# Patient Record
Sex: MALE | Race: WHITE | URBAN - METROPOLITAN AREA
[De-identification: names, ages, dates, MRNs, and addresses within clinical notes are randomized per-mention and may not be internally consistent; named-entity substitution may affect disease eponyms.]

---
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
## 2020-05-29 ENCOUNTER — TELEPHONE (OUTPATIENT)
Dept: FAMILY MEDICINE CLINIC | Facility: CLINIC | Age: 20
End: 2020-05-29

## 2020-05-29 NOTE — TELEPHONE ENCOUNTER
Scheduled appt sooner. Bug bite on leg.      Future Appointments   Date Time Provider Trever Beasley   6/1/2020  4:15 PM ILIR Self [facility] [place] [facility]

## 2020-05-29 NOTE — TELEPHONE ENCOUNTER
Future Appointments   Date Time Provider Trever Beasley   6/22/2020  4:15 PM ILIR Lafleur [facility] [place] [facility] [place]       Made an appointment online for a bug bite. Please triage.

## 2020-06-01 ENCOUNTER — OFFICE VISIT (OUTPATIENT)
Dept: FAMILY MEDICINE CLINIC | Facility: CLINIC | Age: 20
End: 2020-06-01
Payer: COMMERCIAL

## 2020-06-01 VITALS
RESPIRATION RATE: 16 BRPM | BODY MASS INDEX: 23.91 KG/M2 | TEMPERATURE: 99 F | HEIGHT: 70 IN | OXYGEN SATURATION: 97 % | SYSTOLIC BLOOD PRESSURE: 120 MMHG | DIASTOLIC BLOOD PRESSURE: 70 MMHG | WEIGHT: 167 LBS | HEART RATE: 82 BPM
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
## 2020-06-01 DIAGNOSIS — S40.862A INSECT BITE OF LEFT UPPER ARM, INITIAL ENCOUNTER: Primary | ICD-10-CM

## 2020-06-01 PROCEDURE — 99213 OFFICE O/P EST LOW 20 MIN: CPT | Performed by: NURSE PRACTITIONER

## 2020-06-01 NOTE — PROGRESS NOTES
Stewart Bronson is a 23year old male. Patient presents with: Other: possible bug bite on left bicep. Occurred 2 weeks ago       HPI:   Complaints of an insect bite 2 weeks ago- was painful - got more red - pulsating. Itched -  -  warm    No fever. were placed in this encounter. Meds & Refills for this Visit:  Requested Prescriptions      No prescriptions requested or ordered in this encounter       Imaging & Consults:  None    No follow-ups on file.   Patient Instructions   Monitor wound-  If in

## 2020-08-24 ENCOUNTER — TELEPHONE (OUTPATIENT)
Dept: FAMILY MEDICINE CLINIC | Facility: CLINIC | Age: 20
End: 2020-08-24

## 2020-08-24 NOTE — TELEPHONE ENCOUNTER
We do not have rapid tests readily available at this time for patients from the outpatient setting, it would be a send out test which can take anywhere between 2-7 days on average to return; nearest location with [facility] would be through [facility]

## 2020-08-24 NOTE — TELEPHONE ENCOUNTER
See previous note. Pt's Mom will call [facility] and If Rafaela Shields can't be seen and tested there,  He will go to [id number] [facility] ER. Pt is symptomatic; sore throat, nausea, fatigue, HA.   He is sitting in his car, as he can't go into the house

## 2020-08-24 NOTE — TELEPHONE ENCOUNTER
Future appt:    Last Appointment with provider:   Visit date not found  Last appointment at [facility]:  7/8/2020  No results found for: CHOLEST, HDL, LDL, TRIGLY, TRIG  No results found for: EAG, A1C  No results found for: T4F, TSH, TSHT4    No follow-u

## 2020-08-24 NOTE — TELEPHONE ENCOUNTER
+ COVID contact - having fatigue & itchy throat - Mother has Lupus & COPD to wants to get tested ASAP

## 2023-06-27 ENCOUNTER — OFFICE VISIT (OUTPATIENT)
Dept: FAMILY MEDICINE CLINIC | Facility: CLINIC | Age: 23
End: 2023-06-27
Payer: COMMERCIAL

## 2023-06-27 VITALS
WEIGHT: 179.19 LBS | DIASTOLIC BLOOD PRESSURE: 74 MMHG | OXYGEN SATURATION: 98 % | BODY MASS INDEX: 25.65 KG/M2 | SYSTOLIC BLOOD PRESSURE: 126 MMHG | HEIGHT: 70 IN | RESPIRATION RATE: 16 BRPM | HEART RATE: 66 BPM | TEMPERATURE: 97 F

## 2023-06-27 DIAGNOSIS — S06.0X0A CONCUSSION WITHOUT LOSS OF CONSCIOUSNESS, INITIAL ENCOUNTER: ICD-10-CM
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
## 2023-06-27 DIAGNOSIS — Z48.02: ICD-10-CM

## 2023-06-27 DIAGNOSIS — R11.2 NAUSEA AND VOMITING, UNSPECIFIED VOMITING TYPE: ICD-10-CM

## 2023-06-27 PROCEDURE — 3008F BODY MASS INDEX DOCD: CPT

## 2023-06-27 PROCEDURE — 99203 OFFICE O/P NEW LOW 30 MIN: CPT

## 2023-06-27 PROCEDURE — 3074F SYST BP LT 130 MM HG: CPT

## 2023-06-27 PROCEDURE — 3078F DIAST BP <80 MM HG: CPT

## 2023-06-27 RX ORDER — MULTIVIT-MIN/IRON FUM/FOLIC AC 7.5 MG-4
1 TABLET ORAL DAILY
COMMUNITY

## 2023-06-27 RX ORDER — NAPROXEN SODIUM 220 MG
220 TABLET ORAL EVERY 12 HOURS PRN
COMMUNITY

## 2023-06-27 RX ORDER — HYDROCODONE BITARTRATE AND ACETAMINOPHEN 10; 325 MG/1; MG/1
1 TABLET ORAL NIGHTLY
COMMUNITY
Start: 2023-06-18

## 2023-06-27 RX ORDER — CYCLOBENZAPRINE HCL 10 MG
1 TABLET ORAL 3 TIMES DAILY PRN
COMMUNITY
Start: 2023-06-18

## 2023-07-11 ENCOUNTER — OFFICE VISIT (OUTPATIENT)
Dept: FAMILY MEDICINE CLINIC | Facility: CLINIC | Age: 23
End: 2023-07-11
Payer: COMMERCIAL

## 2023-07-11 VITALS
WEIGHT: 176.81 LBS | BODY MASS INDEX: 25.31 KG/M2 | OXYGEN SATURATION: 96 % | HEIGHT: 70 IN | HEART RATE: 69 BPM | SYSTOLIC BLOOD PRESSURE: 110 MMHG | RESPIRATION RATE: 16 BRPM | TEMPERATURE: 98 F | DIASTOLIC BLOOD PRESSURE: 70 MMHG

## 2023-07-11 DIAGNOSIS — S06.0X0D CONCUSSION WITHOUT LOSS OF CONSCIOUSNESS, SUBSEQUENT ENCOUNTER: ICD-10-CM

## 2023-07-11 DIAGNOSIS — Z09 FOLLOW-UP EXAM: Primary | ICD-10-CM

## 2023-07-11 PROCEDURE — 3074F SYST BP LT 130 MM HG: CPT

## 2023-07-11 PROCEDURE — 3078F DIAST BP <80 MM HG: CPT

## 2023-07-11 PROCEDURE — 99214 OFFICE O/P EST MOD 30 MIN: CPT

## 2023-07-11 PROCEDURE — 3008F BODY MASS INDEX DOCD: CPT

## 2023-07-11 NOTE — PATIENT INSTRUCTIONS
Follow up in 1 month    Your concussion evaluation has improved from two weeks. We will continue with cognitive rest and physical rest and follow up in one month to ensure you are feeling better.

## 2023-08-09 ENCOUNTER — OFFICE VISIT (OUTPATIENT)
Dept: FAMILY MEDICINE CLINIC | Facility: CLINIC | Age: 23
End: 2023-08-09
Payer: COMMERCIAL

## 2023-08-09 VITALS
SYSTOLIC BLOOD PRESSURE: 86 MMHG | RESPIRATION RATE: 16 BRPM | DIASTOLIC BLOOD PRESSURE: 50 MMHG | HEIGHT: 70 IN | TEMPERATURE: 98 F | HEART RATE: 60 BPM | WEIGHT: 193 LBS | BODY MASS INDEX: 27.63 KG/M2

## 2023-08-09 DIAGNOSIS — Z09 FOLLOW-UP EXAM: ICD-10-CM

## 2023-08-09 DIAGNOSIS — S06.9X0S TRAUMATIC BRAIN INJURY, WITHOUT LOSS OF CONSCIOUSNESS, SEQUELA (HCC): Primary | ICD-10-CM

## 2023-08-09 PROCEDURE — 3008F BODY MASS INDEX DOCD: CPT

## 2023-08-09 PROCEDURE — 3078F DIAST BP <80 MM HG: CPT

## 2023-08-09 PROCEDURE — 3074F SYST BP LT 130 MM HG: CPT

## 2023-08-09 PROCEDURE — 99214 OFFICE O/P EST MOD 30 MIN: CPT

## 2023-08-09 NOTE — PATIENT INSTRUCTIONS
Follow up as needed    Continue your cognitive and physical rest    Please return if symptoms worsen or fail to improve.

## 2023-08-29 ENCOUNTER — TELEPHONE (OUTPATIENT)
Dept: FAMILY MEDICINE CLINIC | Facility: CLINIC | Age: 23
End: 2023-08-29

## 2023-09-12 ENCOUNTER — TELEPHONE (OUTPATIENT)
Dept: FAMILY MEDICINE CLINIC | Facility: CLINIC | Age: 23
End: 2023-09-12

## (undated) NOTE — LETTER
Date: 12/4/2017    Patient Name: Hawa Rahman          To Whom it may concern: This letter has been written at the patient's request. The above patient was seen at the [facility] for treatment of a medical condition.     This patient s

## (undated) NOTE — LETTER
Date: 6/1/2020    Patient Name: Cece Whittaker          To Whom it may concern: This letter has been written at the patient's request. The above patient was seen at the [facility] for treatment of a medical condition.     This patient sh

## (undated) NOTE — LETTER
Date: 1/3/2018    Patient Name: Shellie Chowdhury          To Whom it may concern: This letter has been written at the patient's request. The above patient was seen at the [facility] for treatment of a medical condition.     This patient sh